# Patient Record
Sex: MALE | Race: WHITE
[De-identification: names, ages, dates, MRNs, and addresses within clinical notes are randomized per-mention and may not be internally consistent; named-entity substitution may affect disease eponyms.]

---

## 2019-06-23 ENCOUNTER — HOSPITAL ENCOUNTER (INPATIENT)
Dept: HOSPITAL 74 - JER | Age: 69
LOS: 4 days | Discharge: HOME | DRG: 699 | End: 2019-06-27
Attending: SPECIALIST | Admitting: SPECIALIST
Payer: COMMERCIAL

## 2019-06-23 VITALS — BODY MASS INDEX: 27.1 KG/M2

## 2019-06-23 DIAGNOSIS — E78.5: ICD-10-CM

## 2019-06-23 DIAGNOSIS — N17.9: ICD-10-CM

## 2019-06-23 DIAGNOSIS — K59.00: ICD-10-CM

## 2019-06-23 DIAGNOSIS — N40.1: ICD-10-CM

## 2019-06-23 DIAGNOSIS — N99.89: Primary | ICD-10-CM
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
## 2019-06-23 DIAGNOSIS — Z88.0: ICD-10-CM

## 2019-06-23 DIAGNOSIS — E11.9: ICD-10-CM

## 2019-06-23 DIAGNOSIS — R14.0: ICD-10-CM

## 2019-06-23 DIAGNOSIS — N13.30: ICD-10-CM

## 2019-06-23 DIAGNOSIS — R33.8: ICD-10-CM

## 2019-06-23 DIAGNOSIS — I10: ICD-10-CM

## 2019-06-23 LAB
ALBUMIN SERPL-MCNC: 3.7 G/DL (ref 3.4–5)
ALP SERPL-CCNC: 72 U/L (ref 45–117)
ALT SERPL-CCNC: 28 U/L (ref 13–61)
ANION GAP SERPL CALC-SCNC: 10 MMOL/L (ref 8–16)
APPEARANCE UR: CLEAR
AST SERPL-CCNC: 39 U/L (ref 15–37)
BACTERIA # UR AUTO: 1.2 /HPF
BASOPHILS # BLD: 0.3 % (ref 0–2)
BILIRUB SERPL-MCNC: 0.6 MG/DL (ref 0.2–1)
BILIRUB UR STRIP.AUTO-MCNC: NEGATIVE MG/DL
BUN SERPL-MCNC: 37.7 MG/DL (ref 7–18)
CALCIUM SERPL-MCNC: 9.6 MG/DL (ref 8.5–10.1)
CASTS URNS QL MICRO: 1 /LPF (ref 0–8)
CHLORIDE SERPL-SCNC: 98 MMOL/L (ref 98–107)
CO2 SERPL-SCNC: 26 MMOL/L (ref 21–32)
COLOR UR: (no result)
CREAT SERPL-MCNC: 3.2 MG/DL (ref 0.55–1.3)
DEPRECATED RDW RBC AUTO: 13.5 % (ref 11.9–15.9)
EOSINOPHIL # BLD: 0.3 % (ref 0–4.5)
EPITH CASTS URNS QL MICRO: 0.9 /HPF
GLUCOSE SERPL-MCNC: 156 MG/DL (ref 74–106)
HCT VFR BLD CALC: 45.5 % (ref 35.4–49)
HGB BLD-MCNC: 15.4 GM/DL (ref 11.7–16.9)
INR BLD: 1.11 (ref 0.83–1.09)
KETONES UR QL STRIP: NEGATIVE
LEUKOCYTE ESTERASE UR QL STRIP.AUTO: (no result)
LYMPHOCYTES # BLD: 3.9 % (ref 8–40)
MCH RBC QN AUTO: 28.5 PG (ref 25.7–33.7)
MCHC RBC AUTO-ENTMCNC: 33.8 G/DL (ref 32–35.9)
MCV RBC: 84.2 FL (ref 80–96)
MONOCYTES # BLD AUTO: 12.4 % (ref 3.8–10.2)
NEUTROPHILS # BLD: 83.1 % (ref 42.8–82.8)
NITRITE UR QL STRIP: NEGATIVE
PH UR: 5 [PH] (ref 5–8)
PLATELET # BLD AUTO: 236 K/MM3 (ref 134–434)
PMV BLD: 7.2 FL (ref 7.5–11.1)
POTASSIUM SERPLBLD-SCNC: 4.3 MMOL/L (ref 3.5–5.1)
PROT SERPL-MCNC: 7.2 G/DL (ref 6.4–8.2)
PROT UR QL STRIP: (no result)
PROT UR QL STRIP: NEGATIVE
PT PNL PPP: 13.1 SEC (ref 9.7–13)
RBC # BLD AUTO: 177.4 /HPF (ref 0–4)
RBC # BLD AUTO: 5.4 M/MM3 (ref 4–5.6)
SODIUM SERPL-SCNC: 134 MMOL/L (ref 136–145)
SP GR UR: 1.01 (ref 1.01–1.03)
UROBILINOGEN UR STRIP-MCNC: 0.2 MG/DL (ref 0.2–1)
WBC # BLD AUTO: 13.2 K/MM3 (ref 4–10)
WBC # UR AUTO: 5 /HPF (ref 0–5)
YEAST BUDDING URNS QL: (no result)

## 2019-06-23 NOTE — PDOC
History of Present Illness





- General


Chief Complaint: Urinary Problem


Stated Complaint: BACK PAIN


Time Seen by Provider: 06/23/19 14:32


History Source: Patient


Exam Limitations: No Limitations





- History of Present Illness


Initial Comments: 





06/23/19 15:03


69M with a PMH of recent greenlight surgery on 6/19/19, HTN, DM, HLD, who 

presents to the ER with nausea and L flank pain. The patient states that since 

yesterday, he has had worsening nausea associated with the antibiotic that he 

was prescribed (Bactrim DS). He has not been able to hold any food down today 

but denies any current nausea. He states that he has been "dribbling" since his 

procedure. He denies fever, chills, dysuria, hematuria but admits to lower 

abdominal pain and abdominal distension. Last bowel movement was 2 days ago. No 

hx of abdominal surgery.








Past History





- Past Medical History


Allergies/Adverse Reactions: 


 Allergies











Allergy/AdvReac Type Severity Reaction Status Date / Time


 


Penicillins Allergy   Verified 06/23/19 13:55











Home Medications: 


Ambulatory Orders





Amlodipine Besylate 2.5 mg PO DAILY 06/23/19 


Aspirin 81 mg PO DAILY 06/23/19 


Lisinopril/Hydrochlorothiazide [Lisinopril-Hctz 20-12.5 mg Tab] 1 each PO DAILY 

06/23/19 


Metformin HCl [Glucophage] 500 mg PO BID 06/23/19 


Phenazopyridine HCl [Pyridium] 100 mg PO DAILY 06/23/19 


Rosuvastatin [Crestor -] 5 mg PO HS 06/23/19 


Sulfamethoxazole/Trimethoprim [Bactrim Ds -] 1 tab PO BID 06/23/19 








COPD: No


Diabetes: Yes


HTN: Yes


Hypercholesterolemia: Yes





- Suicide/Smoking/Psychosocial Hx


Smoking History: Former smoker


Have you smoked in the past 12 months: No


Information on smoking cessation initiated: No


Hx Alcohol Use: No


Drug/Substance Use Hx: No





**Review of Systems





- Review of Systems


Able to Perform ROS?: Yes


Comments:: 





06/23/19 15:05


GENERAL/CONSTITUTIONAL: No fever or chills. No weakness.


HEAD, EYES, EARS, NOSE AND THROAT: No change in vision. No ear pain or 

discharge. No sore throat.


CARDIOVASCULAR: No chest pain, palpitations, or lightheadedness.


RESPIRATORY: No cough, wheezing, shortness of breath, or hemoptysis.


GASTROINTESTINAL: + for abdominal pain and distention. No abdominal pain, nausea

, vomiting, diarrhea, or constipation. 


GENITOURINARY: + for frequency and L CVA pain. No dysuria, hematuria, or change 

in urination.


MUSCULOSKELETAL: No joint or muscle swelling or pain. No neck or back pain.


SKIN: No rash or lesions. 


NEUROLOGIC: No headache, numbness, tingling, focal weakness, loss of 

consciousness, or change in strength/sensation.





Is the patient limited English proficient: No





*Physical Exam





- Vital Signs


 Last Vital Signs











Temp Pulse Resp BP Pulse Ox


 


 99.2 F   100 H  20   144/76   94 L


 


 06/23/19 13:58  06/23/19 13:58  06/23/19 13:58  06/23/19 13:58  06/23/19 13:58














- Physical Exam


Comments: 





06/23/19 15:06


GENERAL: Well developed, well nourished. Awake and alert. No acute distress.


HEENT: Normocephalic, atraumatic. Hearing grossly normal. Moist mucous 

membranes. PERRLA, EOMI. No conjunctival pallor. Sclera are non-icteric. 


NECK: Supple. Full ROM. No JVD. 


CARDIOVASCULAR: Regular rate and rhythm. No murmurs, rubs, or gallops.


PULMONARY: No evidence of respiratory distress. Lungs clear to auscultation 

bilaterally. No wheezing, rales or rhonchi.


ABDOMINAL: Soft. Nontender but distended. No rebound or guarding.


GENITOURINARY: L CVA tenderness.


MUSCULOSKELETAL: Normal range of motion at all joints. No bony deformities or 

tenderness. 


EXTREMITIES: No cyanosis. No clubbing. No edema. No calf tenderness or swelling.


SKIN: Warm and dry. Normal capillary refill. No rashes. No jaundice. 


NEUROLOGICAL: Alert, awake, appropriate. Cranial nerves 2-12 intact. Normal 

speech. Gait is normal without ataxia.


PSYCHIATRIC: Cooperative. Good eye contact. Appropriate mood and affect.








ED Treatment Course





- LABORATORY


CBC & Chemistry Diagram: 


 06/23/19 15:22





 06/23/19 15:22





- RADIOLOGY


Radiology Studies Ordered: 














 Category Date Time Status


 


 ABDOMEN & PELVIS CT W/O CONTR [CT] Stat CT Scan  06/23/19 14:59 Ordered














Medical Decision Making





- Medical Decision Making





06/23/19 15:10


69M with a recent greenlight procedure who presents with nausea and distention 

concerning for urinary retention vs UTI vs pyelo. Will obtain labs, US, and UA. 

Pending labs and imaging. Pt noted to be mildly tachycardic. Will treat pain 

and give fluids.





Case d/w Dr. Haines who recommends a bladder US to evaluate for urinary 

retention. He also recommends switch abx to cefuroxime if the patient cannot 

tolerate bactrim. 





06/23/19 15:36


UA shows trace LE with 1.2 bacteria, unclear if definite UTI, especially with a 

lack of dysuria. However, due to urgency and L flank pain, still concern for 

pyelo.





06/23/19 17:24


US shows L hydro with post void residual of 750 (prevoid 950). 





Dr. Franz aware and agrees with vargas placement. 





Will give ceftriaxone for abx coverage. Pt does not remember his allergic 

reaction to PCNs, will monitor closely.





06/23/19 18:20


I have endorsed the patient to Dr. Greene for admission.





*DC/Admit/Observation/Transfer


Diagnosis at time of Disposition: 


 DANIELLA (acute kidney injury)








- Discharge Dispostion


Condition at time of disposition: Guarded


Decision to Admit order: Yes





- Referrals


Referrals: 


Bethany Garcia MD [Primary Care Provider] - 





- Patient Instructions





- Post Discharge Activity

## 2019-06-24 LAB
ALBUMIN SERPL-MCNC: 2.9 G/DL (ref 3.4–5)
ALP SERPL-CCNC: 62 U/L (ref 45–117)
ALT SERPL-CCNC: 24 U/L (ref 13–61)
ANION GAP SERPL CALC-SCNC: 6 MMOL/L (ref 8–16)
AST SERPL-CCNC: 12 U/L (ref 15–37)
BILIRUB SERPL-MCNC: 0.6 MG/DL (ref 0.2–1)
BUN SERPL-MCNC: 22.6 MG/DL (ref 7–18)
CALCIUM SERPL-MCNC: 8.9 MG/DL (ref 8.5–10.1)
CHLORIDE SERPL-SCNC: 106 MMOL/L (ref 98–107)
CO2 SERPL-SCNC: 27 MMOL/L (ref 21–32)
CREAT SERPL-MCNC: 1.5 MG/DL (ref 0.55–1.3)
DEPRECATED RDW RBC AUTO: 13.4 % (ref 11.9–15.9)
GLUCOSE SERPL-MCNC: 138 MG/DL (ref 74–106)
HCT VFR BLD CALC: 38.2 % (ref 35.4–49)
HGB BLD-MCNC: 13.3 GM/DL (ref 11.7–16.9)
MCH RBC QN AUTO: 28.9 PG (ref 25.7–33.7)
MCHC RBC AUTO-ENTMCNC: 34.8 G/DL (ref 32–35.9)
MCV RBC: 82.9 FL (ref 80–96)
PLATELET # BLD AUTO: 200 K/MM3 (ref 134–434)
PMV BLD: 6.8 FL (ref 7.5–11.1)
POTASSIUM SERPLBLD-SCNC: 3.9 MMOL/L (ref 3.5–5.1)
PROT SERPL-MCNC: 6.1 G/DL (ref 6.4–8.2)
RBC # BLD AUTO: 4.61 M/MM3 (ref 4–5.6)
SODIUM SERPL-SCNC: 138 MMOL/L (ref 136–145)
WBC # BLD AUTO: 9.8 K/MM3 (ref 4–10)

## 2019-06-24 RX ADMIN — INSULIN ASPART SCH UNITS: 100 INJECTION, SOLUTION INTRAVENOUS; SUBCUTANEOUS at 17:51

## 2019-06-24 RX ADMIN — INSULIN ASPART SCH: 100 INJECTION, SOLUTION INTRAVENOUS; SUBCUTANEOUS at 12:29

## 2019-06-24 RX ADMIN — SODIUM CHLORIDE SCH MLS/HR: 9 INJECTION, SOLUTION INTRAVENOUS at 10:08

## 2019-06-24 RX ADMIN — ASPIRIN 81 MG SCH: 81 TABLET ORAL at 10:08

## 2019-06-24 RX ADMIN — ROSUVASTATIN CALCIUM SCH MG: 5 TABLET, FILM COATED ORAL at 21:42

## 2019-06-24 RX ADMIN — AMLODIPINE BESYLATE SCH MG: 2.5 TABLET ORAL at 10:08

## 2019-06-24 RX ADMIN — INSULIN ASPART SCH: 100 INJECTION, SOLUTION INTRAVENOUS; SUBCUTANEOUS at 21:42

## 2019-06-24 NOTE — HP
Admitting History and Physical





- Primary Care Physician


PCP: Rios Greene





- Admission


Chief Complaint: Vomiting, Difficulty urinating


History of Present Illness: 





Pt with Hx/o BPH, s/p greelight prostate procedure on last Wednesday, developed 

heamturia and UR, had Flynn cath placed and removed before the weekend; he was 

started on Bactrim and Pyridium. Over the weekend pt developed nausea, vomiting

, chills, difficulty with urination. Yesterday he came to ER, found to have UR, 

UTI; pt had Flynn placed and was started on IV abtx.


History Source: Patient





- Past Medical History


Cardiovascular: Yes: HTN, Hyperlipdemia


Endocrine: Yes: Diabetes Mellitus





- Past Surgical History


Additional Past Surgical History: 





right ankle Sx with hardware palcement





- Smoking History


Smoking history: Former smoker


Have you smoked in the past 12 months: No





- Alcohol/Substance Use


Hx Alcohol Use: No





Home Medications





- Allergies


Allergies/Adverse Reactions: 


 Allergies











Allergy/AdvReac Type Severity Reaction Status Date / Time


 


Penicillins Allergy   Verified 06/23/19 13:55














- Home Medications


Home Medications: 


Ambulatory Orders





Amlodipine Besylate 2.5 mg PO DAILY 06/23/19 


Aspirin 81 mg PO DAILY 06/23/19 


Lisinopril/Hydrochlorothiazide [Lisinopril-Hctz 20-12.5 mg Tab] 1 each PO DAILY 

06/23/19 


Metformin HCl [Glucophage] 500 mg PO ACBK 06/23/19 


Phenazopyridine HCl [Pyridium] 100 mg PO TID 06/23/19 


Rosuvastatin [Crestor -] 5 mg PO HS 06/23/19 


Sulfamethoxazole/Trimethoprim [Bactrim Ds -] 1 tab PO BID 06/23/19 


Ubidecarenone [Co Q-10] 100 mg PO DAILY 06/24/19 











Review of Systems





- Review of Systems


Constitutional: denies: Chills, Fever


Eyes: denies: Blurred Vision, Double Vision


HENT: reports: Ear Discharge, Nasal Congestion.  denies: Throat Pain


Neck: denies: Pain on Movement, Stiffness


Cardiovascular: denies: Chest Pain, Edema


Respiratory: denies: Cough, SOB


Gastrointestinal: reports: Bloating, Constipation (for 4 days)


Genitourinary: denies: Burning, Dysuria


Musculoskeletal: denies: Back Pain, Muscle Pain


Integumentary: denies: Bruising, Eczema


Neurological: denies: Change in LOC, Change in Speech, Numbness


Endocrine: denies: Excessive Sweating, Intolerance to Cold


Hematology/Lymphatic: denies: Easily Bruised, Excessive Bleeding


Psychiatric: denies: Anxiety, Depression





Physical Examination


Vital Signs: 


 Vital Signs











Temperature  98.4 F   06/24/19 06:00


 


Pulse Rate  87   06/24/19 06:00


 


Respiratory Rate  20   06/24/19 06:00


 


Blood Pressure  136/63   06/24/19 06:00


 


O2 Sat by Pulse Oximetry (%)  98   06/23/19 20:04











Constitutional: Yes: No Distress, Calm


Eyes: Yes: Conjunctiva Clear, EOM Intact


HENT: No: Epistaxis, Rhinnorhea


Neck: Yes: Trachea Midline.  No: Lymphadenopathy


Cardiovascular: Yes: Regular Rate and Rhythm, S1, S2


Respiratory: Yes: Regular, CTA Bilaterally.  No: Rales


Gastrointestinal: Yes: Normal Bowel Sounds, Soft, Distention.  No: Palpable Mass

, Tenderness, Tenderness, Rebound


...Rectal Exam: Yes: Deferred


Edema: No


Neurological: Yes: Alert, Oriented


Psychiatric: Yes: Alert, Oriented


Labs: 


 CBC, BMP





 06/24/19 08:12 





 06/24/19 08:12 











Imaging





- Results


Ultrasound: Report Reviewed





Problem List





- Problems


(1) Urinary retention


Code(s): R33.9 - RETENTION OF URINE, UNSPECIFIED   





(2) BPH (benign prostatic hyperplasia)


Code(s): N40.0 - BENIGN PROSTATIC HYPERPLASIA WITHOUT LOWER URINRY TRACT SYMP   





(3) Constipation


Assessment/Plan: 








Trial of Prune Juice, Colace and Senna; consider enema


Code(s): K59.00 - CONSTIPATION, UNSPECIFIED   





(4) Diabetes mellitus


Code(s): E11.9 - TYPE 2 DIABETES MELLITUS WITHOUT COMPLICATIONS   





(5) HTN (hypertension)


Code(s): I10 - ESSENTIAL (PRIMARY) HYPERTENSION   





(6) HLD (hyperlipidemia)


Code(s): E78.5 - HYPERLIPIDEMIA, UNSPECIFIED   





(7) DANIELLA (acute kidney injury)


Code(s): N17.9 - ACUTE KIDNEY FAILURE, UNSPECIFIED   





Assessment/Plan








Flynn catheter


IFV


 consult; pt's conditon was d/w Dr. Alex


ID consult in a pt with PCN allergy


AM labs


Pt's condition was d/w his nurse

## 2019-06-24 NOTE — CON.GU
Consult


Consult Specialty:: 


Referred by:: nora


Reason for Consultation:: urinary retention





- History of Present Illness


Chief Complaint: urinary retention


History of Present Illness: 





69 year old male 5 days S/P Greenlight laser.  He was admitted to the hospital 

with urinary retention and increased creatinine.  Vargas was placed. both have 

resolved.  





- History Source


History Provided By: Patient, Medical Record





- Past Medical History


Renal/: Yes: BPH





- Alcohol/Substance Use


Hx Alcohol Use: No





- Smoking History


Smoking history: Former smoker


Have you smoked in the past 12 months: No





Home Medications





- Allergies


Allergies/Adverse Reactions: 


 Allergies











Allergy/AdvReac Type Severity Reaction Status Date / Time


 


Penicillins Allergy   Verified 06/23/19 13:55














- Home Medications


Home Medications: 


Ambulatory Orders





Amlodipine Besylate 2.5 mg PO DAILY 06/23/19 


Aspirin 81 mg PO DAILY 06/23/19 


Lisinopril/Hydrochlorothiazide [Lisinopril-Hctz 20-12.5 mg Tab] 1 each PO DAILY 

06/23/19 


Metformin HCl [Glucophage] 500 mg PO ACBK 06/23/19 


Phenazopyridine HCl [Pyridium] 100 mg PO TID 06/23/19 


Rosuvastatin [Crestor -] 5 mg PO HS 06/23/19 


Sulfamethoxazole/Trimethoprim [Bactrim Ds -] 1 tab PO BID 06/23/19 


Ubidecarenone [Co Q-10] 100 mg PO DAILY 06/24/19 











Review of Systems





- Review of Systems


Gastrointestinal: reports: Bloating, Constipation


Genitourinary: reports: Hematuria





Physical Exam-


Vital Signs: 


 Vital Signs











Temperature  98.4 F   06/24/19 06:00


 


Pulse Rate  87   06/24/19 06:00


 


Respiratory Rate  20   06/24/19 06:00


 


Blood Pressure  136/63   06/24/19 06:00


 


O2 Sat by Pulse Oximetry (%)  98   06/23/19 20:04











Gastrointestinal: Yes: Distention


Renal/: Yes: Vargas Present.  No: Bladder Distention, CVA Tenderness - Left, 

CVA Tenderness - Right, Hematuria


Labs: 


 CBC, BMP





 06/24/19 08:12 





 06/24/19 08:12 











Problem List





- Problems


(1) Urinary retention due to benign prostatic hyperplasia


Assessment/Plan: 


creatinine and wbc are improved, ok to discharge on abx and with vargas/ follow 

up with Dr. Jones this week


Code(s): N40.1 - BENIGN PROSTATIC HYPERPLASIA WITH LOWER URINARY TRACT SYMP; 

R33.8 - OTHER RETENTION OF URINE

## 2019-06-24 NOTE — CON.ID
Consult


Consult Specialty:: infectious disease


Referred by:: dr melendez


Reason for Consultation:: possible uti





- History of Present Illness


Chief Complaint: urine dribbling


History of Present Illness: 





70 yo man s/p MRI fusion Biopsy 2 1/2 weeks ago was given bactrim to take for 

four days without incident


, then underwent greenlight surgery last Wednesday- post surgery he had 

clotting and 


returned on Thursday to have avrgas changed, he then went back friday and had 

the vargas removed


he was given bactrim and pyridium to take - which he took until the day of 

admission- nausea and vomiting


no fevers


+abdominal distention


no BM for 2 days





in the ED vargas was placed for PVR of 750 cc


renal/bladder sono with bilateral hydronephrosis with urinary retention


he was noted to have elevated bun/cr





he has voided over 4 liters now that he has a vargas





no vomiting today


ate 2 meals











unknown penicillin allergy





- History Source


History Provided By: Patient, Family Member


Limitations to Obtaining History: No Limitations





- Past Medical History


Cardio/Vascular: Yes: HTN, Hyperlipdemia


Renal/: Yes: BPH


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


Endocrine: Yes: Diabetes Mellitus





- Alcohol/Substance Use


Hx Alcohol Use: No


History of Substance Use: reports: None





- Smoking History


Smoking history: Former smoker


Have you smoked in the past 12 months: No





- Social History


ADL: Independent


Occupation: retired 


Place of Birth: [place]


History of Recent Travel: No





Home Medications





- Allergies


Allergies/Adverse Reactions: 


 Allergies











Allergy/AdvReac Type Severity Reaction Status Date / Time


 


Penicillins Allergy   Verified 06/23/19 13:55














- Home Medications


Home Medications: 


Ambulatory Orders





Amlodipine Besylate 2.5 mg PO DAILY 06/23/19 


Aspirin 81 mg PO DAILY 06/23/19 


Lisinopril/Hydrochlorothiazide [Lisinopril-Hctz 20-12.5 mg Tab] 1 each PO DAILY 

06/23/19 


Metformin HCl [Glucophage] 500 mg PO ACBK 06/23/19 


Phenazopyridine HCl [Pyridium] 100 mg PO TID 06/23/19 


Rosuvastatin [Crestor -] 5 mg PO HS 06/23/19 


Sulfamethoxazole/Trimethoprim [Bactrim Ds -] 1 tab PO BID 06/23/19 


Ubidecarenone [Co Q-10] 100 mg PO DAILY 06/24/19 











Review of Systems





- Review of Systems


Constitutional: reports: No Symptoms


Eyes: reports: No Symptoms


HENT: reports: No Symptoms


Neck: reports: No Symptoms


Cardiovascular: reports: No Symptoms


Respiratory: reports: No Symptoms.  denies: Cough


Gastrointestinal: reports: Bloating, Constipation.  denies: Vomiting


Genitourinary: reports: No Symptoms





Physical Exam


Vital Signs: 


 Vital Signs











Temperature  98.4 F   06/24/19 06:00


 


Pulse Rate  87   06/24/19 06:00


 


Respiratory Rate  20   06/24/19 06:00


 


Blood Pressure  136/63   06/24/19 06:00


 


O2 Sat by Pulse Oximetry (%)  98   06/23/19 20:04











Constitutional: Yes: Well Nourished, No Distress, Calm


Eyes: Yes: Conjunctiva Clear


HENT: Yes: Atraumatic, Normocephalic


Neck: Yes: Supple


Cardiovascular: Yes: Regular Rate and Rhythm


Respiratory: Yes: Regular, CTA Bilaterally


Gastrointestinal: Yes: Normal Bowel Sounds, Soft, Abdomen, Obese, Other (

distended, soft)


...Rectal Exam: Yes: Deferred


Renal/: Yes: Vargas Present (clear urine)


Extremities: Yes: WNL


Edema: No


Psychiatric: Yes: Alert, Oriented


Labs: 


 CBC, BMP





 06/24/19 08:12 





 06/24/19 08:12 





 Microbiology





06/23/19 14:48   Urine - Urine Clean Catch   Urine Culture - Final


                            NO GROWTH OBTAINED











Imaging





- Results


Ultrasound: Report Reviewed (bilateral hydronephrosis with urinary retention)





Problem List





- Problems


(1) Urinary retention due to benign prostatic hyperplasia


Code(s): N40.1 - BENIGN PROSTATIC HYPERPLASIA WITH LOWER URINARY TRACT SYMP; 

R33.8 - OTHER RETENTION OF URINE   





(2) DANIELLA (acute kidney injury)


Code(s): N17.9 - ACUTE KIDNEY FAILURE, UNSPECIFIED   





(3) Abdominal distention


Code(s): R14.0 - ABDOMINAL DISTENSION (GASEOUS)   





(4) Penicillin allergy


Code(s): Z88.0 - ALLERGY STATUS TO PENICILLIN   





Assessment/Plan





urine culture is negative


he has no fever


will d/c antibioitcs 


xray abdomen for distention-most likely due to constipation


pen allergy- noted-unspecified by patient








d/w urology Dr Dos Santos

## 2019-06-24 NOTE — EKG
Test Reason : 

Blood Pressure : ***/*** mmHG

Vent. Rate : 088 BPM     Atrial Rate : 088 BPM

   P-R Int : 210 ms          QRS Dur : 092 ms

    QT Int : 374 ms       P-R-T Axes : 051 -14 039 degrees

   QTc Int : 452 ms

 

SINUS RHYTHM WITH 1ST DEGREE A-V BLOCK

INFERIOR INFARCT , AGE UNDETERMINED

ANTERIOR INFARCT , AGE UNDETERMINED

ABNORMAL ECG

NO PREVIOUS ECGS AVAILABLE

Confirmed by MARTIN HAIRSTON MD ([id number]) on 6/24/2019 10:35:17 AM

 

Referred By:             Confirmed By:MARTIN HAIRSTON MD

## 2019-06-25 LAB
ALBUMIN SERPL-MCNC: 2.9 G/DL (ref 3.4–5)
ALP SERPL-CCNC: 61 U/L (ref 45–117)
ALT SERPL-CCNC: 45 U/L (ref 13–61)
ANION GAP SERPL CALC-SCNC: 8 MMOL/L (ref 8–16)
AST SERPL-CCNC: 23 U/L (ref 15–37)
BILIRUB SERPL-MCNC: 0.6 MG/DL (ref 0.2–1)
BUN SERPL-MCNC: 17.7 MG/DL (ref 7–18)
CALCIUM SERPL-MCNC: 8.8 MG/DL (ref 8.5–10.1)
CHLORIDE SERPL-SCNC: 104 MMOL/L (ref 98–107)
CO2 SERPL-SCNC: 25 MMOL/L (ref 21–32)
CREAT SERPL-MCNC: 1.1 MG/DL (ref 0.55–1.3)
DEPRECATED RDW RBC AUTO: 13.1 % (ref 11.9–15.9)
GLUCOSE SERPL-MCNC: 131 MG/DL (ref 74–106)
HCT VFR BLD CALC: 39.7 % (ref 35.4–49)
HGB BLD-MCNC: 13.4 GM/DL (ref 11.7–16.9)
MCH RBC QN AUTO: 28.4 PG (ref 25.7–33.7)
MCHC RBC AUTO-ENTMCNC: 33.7 G/DL (ref 32–35.9)
MCV RBC: 84.4 FL (ref 80–96)
PLATELET # BLD AUTO: 231 K/MM3 (ref 134–434)
PMV BLD: 6.7 FL (ref 7.5–11.1)
POTASSIUM SERPLBLD-SCNC: 3.7 MMOL/L (ref 3.5–5.1)
PROT SERPL-MCNC: 6.2 G/DL (ref 6.4–8.2)
RBC # BLD AUTO: 4.7 M/MM3 (ref 4–5.6)
SODIUM SERPL-SCNC: 137 MMOL/L (ref 136–145)
WBC # BLD AUTO: 8.5 K/MM3 (ref 4–10)

## 2019-06-25 RX ADMIN — INSULIN ASPART SCH: 100 INJECTION, SOLUTION INTRAVENOUS; SUBCUTANEOUS at 06:23

## 2019-06-25 RX ADMIN — AMLODIPINE BESYLATE SCH MG: 2.5 TABLET ORAL at 11:06

## 2019-06-25 RX ADMIN — INSULIN ASPART SCH: 100 INJECTION, SOLUTION INTRAVENOUS; SUBCUTANEOUS at 22:47

## 2019-06-25 RX ADMIN — INSULIN ASPART SCH: 100 INJECTION, SOLUTION INTRAVENOUS; SUBCUTANEOUS at 17:23

## 2019-06-25 RX ADMIN — INSULIN ASPART SCH: 100 INJECTION, SOLUTION INTRAVENOUS; SUBCUTANEOUS at 13:07

## 2019-06-25 RX ADMIN — SODIUM CHLORIDE SCH MLS/HR: 9 INJECTION, SOLUTION INTRAVENOUS at 11:07

## 2019-06-25 RX ADMIN — ASPIRIN 81 MG SCH MG: 81 TABLET ORAL at 11:07

## 2019-06-25 RX ADMIN — ROSUVASTATIN CALCIUM SCH MG: 5 TABLET, FILM COATED ORAL at 22:48

## 2019-06-25 NOTE — PN
Progress Note, Physician


History of Present Illness: 





Pt had small BM last might (after Colace, Senna, fleet enema).


Pt's abdomen feels better but still distended when is compare to baseline; pt w/

o nausea, vomiting.


Pt w/o fever, chills, CP, palpitations, legs edema.


Pt w/o Hx/o abdominal Sx, abdominal foreign body, abdominal stent





- Current Medication List


Current Medications: 


Active Medications





Acetaminophen (Tylenol -)  650 mg PO Q6H PRN


   PRN Reason: PAIN 1-3


   Last Admin: 06/24/19 17:47 Dose:  650 mg


Al Hydroxide/Mg Hydroxide (Mylanta Oral Suspension -)  30 ml PO Q6H PRN


   PRN Reason: INDIGESTION


   Last Admin: 06/24/19 20:47 Dose:  30 ml


Amlodipine Besylate (Norvasc -)  2.5 mg PO DAILY [facility]


   Last Admin: 06/24/19 10:08 Dose:  2.5 mg


Aspirin (Asa -)  81 mg PO DAILY [facility]


   Last Admin: 06/24/19 10:08 Dose:  Not Given


Docusate Sodium (Colace -)  100 mg PO BID PRN


   PRN Reason: CONSTIPATION


   Last Admin: 06/24/19 17:47 Dose:  100 mg


Sodium Chloride (Normal Saline -)  1,000 mls @ 75 mls/hr IV ASDIR [facility]


   Last Admin: 06/24/19 10:08 Dose:  75 mls/hr


Insulin Aspart (Novolog Vial Sliding Scale -)  1 vial SQ ACHS [facility]; Protocol


   Last Admin: 06/25/19 06:23 Dose:  Not Given


Rosuvastatin Calcium (Crestor -)  5 mg PO HS [facility]


   Last Admin: 06/24/19 21:42 Dose:  5 mg


Senna (Senna -)  2 tab PO HS PRN


   PRN Reason: CONSTIPATION


   Last Admin: 06/24/19 23:58 Dose:  2 tab











- Objective


Vital Signs: 


 Vital Signs











Temperature  99.1 F   06/25/19 06:30


 


Pulse Rate  90   06/25/19 06:30


 


Respiratory Rate  20   06/25/19 06:30


 


Blood Pressure  137/82   06/25/19 06:30


 


O2 Sat by Pulse Oximetry (%)  93 L  06/24/19 09:00











Constitutional: Yes: No Distress, Calm


Cardiovascular: Yes: Regular Rate and Rhythm, S1, S2


Respiratory: Yes: Regular, CTA Bilaterally.  No: Rales


Gastrointestinal: Yes: Normal Bowel Sounds, Soft, Distention.  No: Tenderness, 

Tenderness, Rebound


Edema: No


Neurological: Yes: Alert, Oriented


Labs: 


 CBC, BMP





 06/25/19 07:21 





 06/25/19 07:21 





 INR, PTT











INR  1.11  (0.83-1.09)  H  06/23/19  15:22    














- ....Imaging


X-ray: Report Reviewed





Problem List





- Problems


(1) Urinary retention


Code(s): R33.9 - RETENTION OF URINE, UNSPECIFIED   





(2) BPH (benign prostatic hyperplasia)


Code(s): N40.0 - BENIGN PROSTATIC HYPERPLASIA WITHOUT LOWER URINRY TRACT SYMP   





(3) Constipation


Code(s): K59.00 - CONSTIPATION, UNSPECIFIED   





(4) Diabetes mellitus


Code(s): E11.9 - TYPE 2 DIABETES MELLITUS WITHOUT COMPLICATIONS   





(5) HTN (hypertension)


Code(s): I10 - ESSENTIAL (PRIMARY) HYPERTENSION   





(6) HLD (hyperlipidemia)


Code(s): E78.5 - HYPERLIPIDEMIA, UNSPECIFIED   





(7) DANIELLA (acute kidney injury)


Code(s): N17.9 - ACUTE KIDNEY FAILURE, UNSPECIFIED   





Assessment/Plan








Flynn catheter


IFV


 consult and ID consult are appreciated.


To f/u with ID and 


AM labs


Pt's condition was d/w his nurse

## 2019-06-26 LAB
ANION GAP SERPL CALC-SCNC: 7 MMOL/L (ref 8–16)
BUN SERPL-MCNC: 14.9 MG/DL (ref 7–18)
CALCIUM SERPL-MCNC: 8.3 MG/DL (ref 8.5–10.1)
CHLORIDE SERPL-SCNC: 105 MMOL/L (ref 98–107)
CO2 SERPL-SCNC: 25 MMOL/L (ref 21–32)
CREAT SERPL-MCNC: 0.9 MG/DL (ref 0.55–1.3)
DEPRECATED RDW RBC AUTO: 13 % (ref 11.9–15.9)
GLUCOSE SERPL-MCNC: 142 MG/DL (ref 74–106)
HCT VFR BLD CALC: 37.5 % (ref 35.4–49)
HGB BLD-MCNC: 12.8 GM/DL (ref 11.7–16.9)
MCH RBC QN AUTO: 28.7 PG (ref 25.7–33.7)
MCHC RBC AUTO-ENTMCNC: 34 G/DL (ref 32–35.9)
MCV RBC: 84.2 FL (ref 80–96)
PLATELET # BLD AUTO: 258 K/MM3 (ref 134–434)
PMV BLD: 6.6 FL (ref 7.5–11.1)
POTASSIUM SERPLBLD-SCNC: 4 MMOL/L (ref 3.5–5.1)
RBC # BLD AUTO: 4.45 M/MM3 (ref 4–5.6)
SODIUM SERPL-SCNC: 137 MMOL/L (ref 136–145)
WBC # BLD AUTO: 8.5 K/MM3 (ref 4–10)

## 2019-06-26 RX ADMIN — INSULIN ASPART SCH: 100 INJECTION, SOLUTION INTRAVENOUS; SUBCUTANEOUS at 12:08

## 2019-06-26 RX ADMIN — ASPIRIN 81 MG SCH MG: 81 TABLET ORAL at 10:12

## 2019-06-26 RX ADMIN — DOCUSATE SODIUM SCH MG: 100 CAPSULE, LIQUID FILLED ORAL at 10:12

## 2019-06-26 RX ADMIN — INSULIN ASPART SCH: 100 INJECTION, SOLUTION INTRAVENOUS; SUBCUTANEOUS at 21:47

## 2019-06-26 RX ADMIN — ROSUVASTATIN CALCIUM SCH MG: 5 TABLET, FILM COATED ORAL at 21:48

## 2019-06-26 RX ADMIN — AMLODIPINE BESYLATE SCH MG: 2.5 TABLET ORAL at 10:12

## 2019-06-26 RX ADMIN — DOCUSATE SODIUM SCH MG: 100 CAPSULE, LIQUID FILLED ORAL at 21:48

## 2019-06-26 RX ADMIN — INSULIN ASPART SCH: 100 INJECTION, SOLUTION INTRAVENOUS; SUBCUTANEOUS at 17:52

## 2019-06-26 RX ADMIN — INSULIN ASPART SCH UNITS: 100 INJECTION, SOLUTION INTRAVENOUS; SUBCUTANEOUS at 06:39

## 2019-06-26 NOTE — PN
Progress Note, Physician


History of Present Illness: 





Pt is asking to have Flynn removed, refusing to go home if not seen by .


Pt's abdomen feels better but still distended when is compare to baseline; pt w/

o nausea, vomiting; hre had BM yestarday.


Pt w/o fever, chills, CP, palpitations, legs edema.


Pt w/o Hx/o abdominal Sx, abdominal foreign body, abdominal stent





- Current Medication List


Current Medications: 


Active Medications





Acetaminophen (Tylenol -)  650 mg PO Q6H PRN


   PRN Reason: PAIN 1-3


   Last Admin: 06/24/19 17:47 Dose:  650 mg


Al Hydroxide/Mg Hydroxide (Mylanta Oral Suspension -)  30 ml PO Q6H PRN


   PRN Reason: INDIGESTION


   Last Admin: 06/24/19 20:47 Dose:  30 ml


Amlodipine Besylate (Norvasc -)  2.5 mg PO DAILY Pending [facility] to [facility]


   Last Admin: 06/26/19 10:12 Dose:  2.5 mg


Aspirin (Asa -)  81 mg PO DAILY [facility]ending [facility] to [facility]


   Last Admin: 06/26/19 10:12 Dose:  81 mg


Docusate Sodium (Colace -)  100 mg PO BID [facility]ending [facility] to [facility]


   Last Admin: 06/26/19 10:12 Dose:  100 mg


Insulin Aspart (Novolog Vial Sliding Scale -)  [facility]; Protocol


   Last Admin: 06/26/19 12:08 Dose:  Not Given


Rosuvastatin Calcium (Crestor -)  5 mg PO HS [facility]ending [facility] to [facility]


   Last Admin: 06/25/19 22:48 Dose:  5 mg


Senna (Senna -)  2 tab PO HS PRN


   PRN Reason: CONSTIPATION


   Last Admin: 06/24/19 23:58 Dose:  2 tab











- Objective


Vital Signs: 


 Vital Signs











Temperature  98.2 F   06/26/19 10:00


 


Pulse Rate  94 H  06/26/19 10:00


 


Respiratory Rate  18   06/26/19 10:00


 


Blood Pressure  158/71   06/26/19 10:00


 


O2 Sat by Pulse Oximetry (%)  99   06/25/19 21:00











Constitutional: Yes: No Distress, Calm


Cardiovascular: Yes: Regular Rate and Rhythm, S1, S2


Respiratory: Yes: Regular, CTA Bilaterally.  No: Rales


Gastrointestinal: Yes: Normal Bowel Sounds, Soft.  No: Tenderness


Edema: No


Neurological: Yes: Alert, Oriented


Labs: 


 CBC, BMP





 06/26/19 07:05 





 06/26/19 07:05 





 INR, PTT











INR  1.11  (0.83-1.09)  H  06/23/19  15:22    














Problem List





- Problems


(1) Urinary retention


Code(s): R33.9 - RETENTION OF URINE, UNSPECIFIED   





(2) BPH (benign prostatic hyperplasia)


Code(s): N40.0 - BENIGN PROSTATIC HYPERPLASIA WITHOUT LOWER URINRY TRACT SYMP   





(3) Constipation


Code(s): K59.00 - CONSTIPATION, UNSPECIFIED   





(4) Diabetes mellitus


Code(s): E11.9 - TYPE 2 DIABETES MELLITUS WITHOUT COMPLICATIONS   





(5) HTN (hypertension)


Code(s): I10 - ESSENTIAL (PRIMARY) HYPERTENSION   





(6) HLD (hyperlipidemia)


Code(s): E78.5 - HYPERLIPIDEMIA, UNSPECIFIED   





(7) DANIELLA (acute kidney injury)


Code(s): N17.9 - ACUTE KIDNEY FAILURE, UNSPECIFIED   





Assessment/Plan








Flynn catheter


To DC IFV


 consult and ID consult are appreciated.


To f/u with 


DC planning.


Pt's condition was d/w his nurse

## 2019-06-26 NOTE — PN
Progres Note


Chief Complaint: urinary retention





- Objective


Vital Signs: 


 Vital Signs











Temperature  99.1 F   06/26/19 15:00


 


Pulse Rate  81   06/26/19 15:00


 


Respiratory Rate  18   06/26/19 15:00


 


Blood Pressure  149/75   06/26/19 15:00


 


O2 Sat by Pulse Oximetry (%)  99   06/25/19 21:00











Constitutional: Yes: Well Nourished


Eyes: Yes: WNL


HENT: Yes: WNL


Neck: Yes: WNL


Respiratory: Yes: Dullness


Gastrointestinal: Yes: Soft


Genitourinary: Yes: WNL


Kidneys: Yes: WNL


Labs/Additional Data: 


 CBC, BMP





 06/26/19 07:05 





 06/26/19 07:05 





 INR, PTT











INR  1.11  (0.83-1.09)  H  06/23/19  15:22    














Imaging





- Results


Ultrasound: Report Reviewed





Problem List





- Problems


(1) BPH (benign prostatic hyperplasia)


Assessment/Plan: 


vargas to sd s/p AUR post BPH procedure 


for voiding trial in am


Code(s): N40.0 - BENIGN PROSTATIC HYPERPLASIA WITHOUT LOWER URINRY TRACT SYMP

## 2019-06-27 VITALS — DIASTOLIC BLOOD PRESSURE: 94 MMHG | TEMPERATURE: 99.5 F | SYSTOLIC BLOOD PRESSURE: 165 MMHG

## 2019-06-27 VITALS — HEART RATE: 80 BPM

## 2019-06-27 LAB
ANION GAP SERPL CALC-SCNC: 8 MMOL/L (ref 8–16)
BUN SERPL-MCNC: 13.1 MG/DL (ref 7–18)
CALCIUM SERPL-MCNC: 8.3 MG/DL (ref 8.5–10.1)
CHLORIDE SERPL-SCNC: 105 MMOL/L (ref 98–107)
CO2 SERPL-SCNC: 25 MMOL/L (ref 21–32)
CREAT SERPL-MCNC: 1 MG/DL (ref 0.55–1.3)
GLUCOSE SERPL-MCNC: 120 MG/DL (ref 74–106)
POTASSIUM SERPLBLD-SCNC: 3.8 MMOL/L (ref 3.5–5.1)
SODIUM SERPL-SCNC: 138 MMOL/L (ref 136–145)

## 2019-06-27 RX ADMIN — INSULIN ASPART SCH: 100 INJECTION, SOLUTION INTRAVENOUS; SUBCUTANEOUS at 07:58

## 2019-06-27 RX ADMIN — ASPIRIN 81 MG SCH MG: 81 TABLET ORAL at 10:05

## 2019-06-27 RX ADMIN — AMLODIPINE BESYLATE SCH MG: 2.5 TABLET ORAL at 10:06

## 2019-06-27 RX ADMIN — DOCUSATE SODIUM SCH MG: 100 CAPSULE, LIQUID FILLED ORAL at 10:06

## 2019-06-27 NOTE — DS
Physical Examination


Vital Signs: 


 Vital Signs











Temperature  99.8 F H  06/26/19 22:00


 


Pulse Rate  80   06/26/19 22:00


 


Respiratory Rate  18   06/26/19 22:00


 


Blood Pressure  142/68   06/26/19 22:00


 


O2 Sat by Pulse Oximetry (%)  99   06/25/19 21:00











Findings/Remarks: 





Pt had Flynn removed this AM; he voided twice, no blood in urine.


Pt w/o abd distension, had BM twice yesterday.


Pt w/o abd pain, back opain, SOB, cg, SOB


Constitutional: Yes: No Distress, Calm


Cardiovascular: Yes: Regular Rate and Rhythm, S1, S2


Respiratory: Yes: Regular, CTA Bilaterally.  No: Rales


Gastrointestinal: Yes: Normal Bowel Sounds, Soft.  No: Tenderness


Edema: No


Neurological: Yes: Alert, Oriented


Labs: 


 CBC, BMP





 06/26/19 07:05 





 06/27/19 05:20 











Discharge Summary


Reason For Visit: ACUTE KIDNEY INJURY


Current Active Problems





DANIELLA (acute kidney injury) (Acute)


Abdominal distention (Acute)


BPH (benign prostatic hyperplasia) (Acute)


Constipation (Acute)


Diabetes mellitus (Acute)


HLD (hyperlipidemia) (Acute)


HTN (hypertension) (Acute)


Penicillin allergy (Acute)


Urinary retention (Acute)


Urinary retention due to benign prostatic hyperplasia (Acute)








Procedures: Principal: Renal/ Bladder US.  Abd XR


Hospital Course: 





Pt with recent greenlight prostate procedure, for BPH, came to ER c/o 

difficulty with urination, found to have UR, ARF. Pt had Flynn placed and was 

started on IVabtx, IVF. He was seen by  (Dr Candelaria/ Karen), ID (Dr Babb; no abtx needed). Pt's reanl function improved; he was treated for 

constiparion. Pt had Flynn removed (this AM) and voided. To DC pt home with 

outpatient f/u.


Condition: Guarded





- Instructions


Diet, Activity, Other Instructions: 


Resume ADA, low salt, low fat/ cholesterol diet


Referrals: 


Bethany Garcia MD [Primary Care Provider] -  (within a week)


Negro Jones MD., MD [Staff Physician] -  (as scheduled)


Disposition: HOME





- Home Medications


Comprehensive Discharge Medication List: 


Ambulatory Orders





See Patient discharge instructions